# Patient Record
Sex: MALE
[De-identification: names, ages, dates, MRNs, and addresses within clinical notes are randomized per-mention and may not be internally consistent; named-entity substitution may affect disease eponyms.]

---

## 2019-06-27 PROBLEM — Z00.00 ENCOUNTER FOR PREVENTIVE HEALTH EXAMINATION: Status: ACTIVE | Noted: 2019-06-27

## 2019-07-01 ENCOUNTER — APPOINTMENT (OUTPATIENT)
Dept: OTOLARYNGOLOGY | Facility: CLINIC | Age: 34
End: 2019-07-01
Payer: COMMERCIAL

## 2019-07-01 VITALS
TEMPERATURE: 97.9 F | HEIGHT: 67 IN | BODY MASS INDEX: 30.61 KG/M2 | HEART RATE: 60 BPM | OXYGEN SATURATION: 97 % | WEIGHT: 195 LBS | SYSTOLIC BLOOD PRESSURE: 113 MMHG | DIASTOLIC BLOOD PRESSURE: 74 MMHG

## 2019-07-01 DIAGNOSIS — Z78.9 OTHER SPECIFIED HEALTH STATUS: ICD-10-CM

## 2019-07-01 DIAGNOSIS — Z83.3 FAMILY HISTORY OF DIABETES MELLITUS: ICD-10-CM

## 2019-07-01 DIAGNOSIS — R79.89 OTHER SPECIFIED ABNORMAL FINDINGS OF BLOOD CHEMISTRY: ICD-10-CM

## 2019-07-01 PROCEDURE — 31575 DIAGNOSTIC LARYNGOSCOPY: CPT

## 2019-07-01 PROCEDURE — 99204 OFFICE O/P NEW MOD 45 MIN: CPT | Mod: 25

## 2019-07-01 RX ORDER — FLUTICASONE PROPIONATE 50 MCG
SPRAY, SUSPENSION NASAL
Refills: 0 | Status: ACTIVE | COMMUNITY

## 2019-07-01 RX ORDER — CETIRIZINE HCL 10 MG
TABLET ORAL
Refills: 0 | Status: ACTIVE | COMMUNITY

## 2019-07-01 RX ORDER — CLOMIPHENE CITRATE 50 MG/1
TABLET ORAL
Refills: 0 | Status: ACTIVE | COMMUNITY

## 2019-07-01 RX ORDER — FEXOFENADINE HYDROCHLORIDE 180 MG/1
TABLET ORAL
Refills: 0 | Status: ACTIVE | COMMUNITY

## 2019-07-01 NOTE — HISTORY OF PRESENT ILLNESS
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 He denies fever, chills sweats, trauma or insect bites. He denies dental problems or pain.  He was seen in an Urgent care center last week and no treatment or tests rendered.  He is here for a second opinion.

## 2019-07-01 NOTE — CONSULT LETTER
[Dear  ___] : Dear  [unfilled], [Consult Letter:] : I had the pleasure of evaluating your patient, [unfilled]. [Please see my note below.] : Please see my note below. [Consult Closing:] : Thank you very much for allowing me to participate in the care of this patient.  If you have any questions, please do not hesitate to contact me. [Sincerely,] : Sincerely, [FreeTextEntry3] : \par Rico Deluca M.D., FACS, ECNU\par Director Center for Thyroid & Parathyroid Surgery\par [facility] at [facility]\par Certified in Thyroid/Parathyroid/Neck Ultrasound, ECNU/ AIUM\par \par , Department of Otolaryngology\par [facility] at [facility]\par

## 2019-07-01 NOTE — PROCEDURE
[Image(s) Captured] : image(s) captured and filed [Unable to Cooperate with Mirror] : patient unable to cooperate with mirror [Gag Reflex] : gag reflex preventing mirror examination [Topical Lidocaine] : topical lidocaine [Flexible Endoscope] : examined with the flexible endoscope [Serial Number: ___] : Serial Number: [unfilled] [de-identified] : The nasal septum is minimally deviated to the left with anterior spur on right. There are no masses or polyps and the nasal mucosa and secretions are normal. The choanae and posterior nasopharynx are normal without masses or drainage. The Eustachian tube orifices appear patent. The pharynx, including the posterior and lateral pharyngeal walls, the vallecula and base of tongue are normal without ulcerations, lesions or masses. The hypopharynx including the pyriform sinuses open well without pooling of secretions, mucosal lesions or masses. The supraglottic larynx including the epiglottis, petiole, arytenoids, glossoepiglottic, aryepiglottic and pharyngoepiglottic folds are normal without mucosal lesions, ulcerations or masses. The glottis reveals normal false vocal folds. The true vocal folds are glistening white, tense and of equal length, without paralysis, having symmetric mobility on adduction and abduction. There are no mucosal lesions, nodules, cysts, erythroplasia or leukoplakia. The posterior cricoid area has healthy pink mucosa in the interarytenoid area and esophageal inlet. There is no thickening/edema of the interarytenoid mucosa suggestive of posterior laryngitis from laryngopharyngeal acid reflux disease. The trachea is clear without narrowing in the immediate subglottic region, without deviation or lesions. \par  [de-identified] : neck mass

## 2019-07-01 NOTE — REASON FOR VISIT
[FreeTextEntry2] : left upper neck mass under jaw x 2 weeks.  [FreeTextEntry1] : PCP is Asmita Coffey MD [place]

## 2019-08-26 ENCOUNTER — APPOINTMENT (OUTPATIENT)
Dept: OTOLARYNGOLOGY | Facility: CLINIC | Age: 34
End: 2019-08-26
Payer: COMMERCIAL

## 2019-08-26 VITALS
RESPIRATION RATE: 15 BRPM | OXYGEN SATURATION: 100 % | SYSTOLIC BLOOD PRESSURE: 116 MMHG | DIASTOLIC BLOOD PRESSURE: 77 MMHG | HEART RATE: 77 BPM | TEMPERATURE: 98.5 F

## 2019-08-26 PROCEDURE — 99214 OFFICE O/P EST MOD 30 MIN: CPT

## 2019-08-26 RX ORDER — AMOXICILLIN AND CLAVULANATE POTASSIUM 875; 125 MG/1; MG/1
875-125 TABLET, COATED ORAL
Qty: 14 | Refills: 0 | Status: COMPLETED | COMMUNITY
Start: 2019-07-01 | End: 2019-08-26

## 2019-08-26 NOTE — HISTORY OF PRESENT ILLNESS
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 He denies fever, chills sweats, trauma or insect bites. He denies dental problems or pain.  He was seen in an Urgent care center last week and no treatment or tests rendered.  He is here for a second opinion. \par  [FreeTextEntry1] : Oscar returns today after a treatment for an infected epidermoid cyst in the left upper neck just over the body of the mandible. He feels that the mass has decreased in size and there is no tenderness or suppuration.  He denies fever, chills or night sweats.

## 2019-08-26 NOTE — CONSULT LETTER
[Dear  ___] : Dear  [unfilled], [Consult Letter:] : I had the pleasure of evaluating your patient, [unfilled]. [Consult Closing:] : Thank you very much for allowing me to participate in the care of this patient.  If you have any questions, please do not hesitate to contact me. [Please see my note below.] : Please see my note below. [Sincerely,] : Sincerely, [FreeTextEntry3] : \par Rico Deluca M.D., FACS, ECNU\par Director Center for Thyroid & Parathyroid Surgery\par [facility] at [facility]\par Certified in Thyroid/Parathyroid/Neck Ultrasound, ECNU/ AIUM\par \par , Department of Otolaryngology\par [facility] at [facility]\par

## 2019-08-26 NOTE — REASON FOR VISIT
[FreeTextEntry2] : left upper neck mass and possible infected epidermoid cyst. [FreeTextEntry1] : PCP is Asmita Coffey MD [place]

## 2020-02-24 ENCOUNTER — APPOINTMENT (OUTPATIENT)
Dept: OTOLARYNGOLOGY | Facility: CLINIC | Age: 35
End: 2020-02-24

## 2020-05-14 ENCOUNTER — APPOINTMENT (OUTPATIENT)
Dept: OTOLARYNGOLOGY | Facility: CLINIC | Age: 35
End: 2020-05-14
Payer: COMMERCIAL

## 2020-05-14 DIAGNOSIS — R22.1 LOCALIZED SWELLING, MASS AND LUMP, NECK: ICD-10-CM

## 2020-05-14 DIAGNOSIS — L72.9 FOLLICULAR CYST OF THE SKIN AND SUBCUTANEOUS TISSUE, UNSPECIFIED: ICD-10-CM

## 2020-05-14 PROCEDURE — 99212 OFFICE O/P EST SF 10 MIN: CPT | Mod: 25,95

## 2020-05-14 NOTE — PHYSICAL EXAM
[Midline] : trachea located in midline position [Normal] : orientation to person, place, and time: normal [de-identified] : There is no visible mass but patient points to a palble subcutaneous nodule to the left of midline adjacent to the lateral edge of the thyroid cartilage.  There is skin erythema noted but no discharge. [de-identified] : no secretions seen. [de-identified] : normal speech and mobility. [de-identified] : voice is normal. [de-identified] : sclera and conjunctiva are anicteric.  [de-identified] : normal breathing, no SOB, no stridor [de-identified] : denies  [de-identified] : n [de-identified] : grossly normal cranial nerves [de-identified] : no visibly enlarged lateral neck nodes [de-identified] : erythema over area of concern but not definite cellulitis

## 2020-05-14 NOTE — CONSULT LETTER
[Dear  ___] : Dear  [unfilled], [Consult Letter:] : I had the pleasure of evaluating your patient, [unfilled]. [Please see my note below.] : Please see my note below. [Consult Closing:] : Thank you very much for allowing me to participate in the care of this patient.  If you have any questions, please do not hesitate to contact me. [Sincerely,] : Sincerely, [FreeTextEntry3] : \par Rico Deluca M.D., FACS, ECNU\par Director Center for Thyroid & Parathyroid Surgery\par [facility] at [facility]\par Certified in Thyroid/Parathyroid/Neck Ultrasound, ECNU/ AIUM\par \par , Department of Otolaryngology\par [facility] at [facility]\par

## 2020-05-14 NOTE — HISTORY OF PRESENT ILLNESS
[Home] : at home, [unfilled] , at the time of the visit. [Patient] : the patient [Other Location: e.g. 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 He denies fever, chills sweats, trauma or insect bites. He denies dental problems or pain.  He was seen in an Urgent care center last week and no treatment or tests rendered.  He is here for a second opinion. \par  [FreeTextEntry1] : This patient encounter was initiated at the patient’s request and gave verbal consent to this audio and visual visit using [employer] occurring during the state of emergency due to COVID-19. Governmental regulation is restricting travel and in-person contact and has recommended use of remote activities and telemedicine whenever possible. I discussed with this patient that there are limitations to telemedicine encounters, including risks associated with the technology platform, technical difficulties, data security, and a limited physical exam. There is also a limitation in performing diagnostic and endoscopic procedures and the patient understood that further testing and workup may be necessary at a later date to arrive at an accurate diagnosis and treatment plan. We discussed that this will be billed as a TeleHealth visit. Joshua Shoenfeld understood and elected to proceed at: 9:02 AM, 05/14/2020.\par \par sOcar complains today of a pea sized firm nodule just lateral to the thyroid cartilage for the past 1 month with slight increase in size and is not tender to touch.  He noted skin irritation over the site after shaving this morning. He denies trauma other than shaving and there has been no drainage from the skin.  He is able to easily palpate the nodule just under the skin. He denies any other palpable neck nodules or mass to suspect lymphadenopathy. He has a past history of  treatment for an infected epidermoid cyst in the left upper neck just over the body of the mandible. He denies fever, body aches, cough, cyanosis, chest burning, anosmia or recent known COVID exposures.  All family members at home are well. He mentioned that his wife is immunocompromised so they have remained sheltered in place.

## 2020-06-02 ENCOUNTER — RX RENEWAL (OUTPATIENT)
Age: 35
End: 2020-06-02

## 2020-06-02 RX ORDER — SULFAMETHOXAZOLE AND TRIMETHOPRIM 800; 160 MG/1; MG/1
800-160 TABLET ORAL TWICE DAILY
Qty: 14 | Refills: 0 | Status: ACTIVE | COMMUNITY
Start: 2020-05-14 | End: 1900-01-01

## 2020-10-06 DIAGNOSIS — L03.90 CELLULITIS, UNSPECIFIED: ICD-10-CM
